# Patient Record
Sex: FEMALE | Employment: OTHER | URBAN - METROPOLITAN AREA
[De-identification: names, ages, dates, MRNs, and addresses within clinical notes are randomized per-mention and may not be internally consistent; named-entity substitution may affect disease eponyms.]

---

## 2017-05-22 ENCOUNTER — NEW REFERRAL (OUTPATIENT)
Dept: URBAN - METROPOLITAN AREA CLINIC 18 | Facility: CLINIC | Age: 82
End: 2017-05-22

## 2017-05-22 VITALS — HEIGHT: 55 IN | SYSTOLIC BLOOD PRESSURE: 118 MMHG | DIASTOLIC BLOOD PRESSURE: 78 MMHG

## 2017-05-24 ASSESSMENT — VISUAL ACUITY
OS_SC: 20/200
OD_SC: 20/50

## 2017-05-24 ASSESSMENT — TONOMETRY
OD_IOP_MMHG: 15
OS_IOP_MMHG: 15

## 2019-08-26 NOTE — PATIENT DISCUSSION
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 PATIENT UNDERSTANDS THAT CATARACT SURGERY WILL NOT FIX HER IRREGULAR SURFACE AND HER VISION MAY REMAIN BLURRY AFTER SURGERY.

## 2019-08-26 NOTE — PATIENT DISCUSSION
Surgery  Counseling: I have discussed the option of glasses versus cataract surgery versus following. It was explained that when vision no longer meets the patient's visual needs and a new prescription for glasses is not likely to improve the patient's visual symptoms, the option of cataract surgery is a reasonable next step. It was explained that there is no guarantee that removing the cataract will improve their visual symptoms, however; it is believed that the cataract is contributing to the patient's visual impairment and surgery may significantly improve both the visual and functional status of the patient. The risks, benefits and alternatives of surgery were discussed with the patient. After this discussion, the patient desires to proceed with cataract surgery with implantation of an intraocular lens to improve vision.

## 2019-08-26 NOTE — PATIENT DISCUSSION
REFRACTIVE ERROR, OU - DISCUSSED OPTION OF CORRECTING AT THE TIME OF CATARACT SURGERY. PT UNDERSTANDS AND ELECTS BEST QUALITY OF DISTANCE VISION AND WILL WEAR GLASSES FOR READING.

## 2019-11-01 NOTE — PATIENT DISCUSSION
CATARACTS, OS- SURGERY NOT RECOMMENDED AT THIS TIME. EXPLAINED THAT NEW GLS SEEM TO IMPROVE THE VA AND ADVISED PT TO RETURN TO REFERRING PHYSICIAN FOR UPDATED RX.  WILL REVISIT SX IF GLS DO NOT PROVIDE SATISFACTORY VA.

## 2019-11-01 NOTE — PATIENT DISCUSSION
New Prescription: prednisol ace-gatiflox-bromfen (prednisol ace-gatiflox-bromfen): drops,suspension: 1-0.5-0.075% 1 drop three times a day as directed into affected eye 11-

## 2019-11-01 NOTE — PATIENT DISCUSSION
REFRACTIVE ERROR OS - OFFERED REFRACTIVE SURGERY. THE PT DOES NOT WANT TO WEAR GLASSES. ADV PT THAT THIS IS NOT COVERED BY HER INSURANCE. THE PT CHOOSES TO PROCEED WITH RLE OS. SHE UNDERSTANDS GLASSES MAY BE NEEDED FOR NEAR AND INTERMEDIATE ACTIVITIES.

## 2019-11-13 NOTE — PATIENT DISCUSSION
SAME DAY S/P PC IOL, OS - CONTINUE DROPS AS DIRECTED. PT RELEASED TO  PROVIDER FOR CONTINUED CARE. RTC PRN.

## 2019-11-13 NOTE — PATIENT DISCUSSION
***This patient had  refractive cataract surgery performed. A toric IOL was placed to achieve a target refraction of  PLANO (which should provide them with satisfactory distance vision with the aid of glasses/contact lenses). ***

## 2019-11-13 NOTE — PATIENT DISCUSSION
Continue: prednisol ace-gatiflox-bromfen (prednisol ace-gatiflox-bromfen): drops,suspension: 1-0.5-0.075% 1 drop three times a day as directed into affected eye 11-

## 2022-11-08 ENCOUNTER — NEW PATIENT (OUTPATIENT)
Dept: URBAN - METROPOLITAN AREA CLINIC 9 | Facility: CLINIC | Age: 87
End: 2022-11-08

## 2022-11-08 DIAGNOSIS — H52.223: ICD-10-CM

## 2022-11-08 DIAGNOSIS — H04.123: ICD-10-CM

## 2022-11-08 DIAGNOSIS — H35.363: ICD-10-CM

## 2022-11-08 DIAGNOSIS — H35.373: ICD-10-CM

## 2022-11-08 PROCEDURE — 92134 CPTRZ OPH DX IMG PST SGM RTA: CPT

## 2022-11-08 PROCEDURE — 92004 COMPRE OPH EXAM NEW PT 1/>: CPT

## 2022-11-08 PROCEDURE — 92015 DETERMINE REFRACTIVE STATE: CPT

## 2022-11-08 ASSESSMENT — TONOMETRY
OS_IOP_MMHG: 12
OD_IOP_MMHG: 12

## 2022-11-08 ASSESSMENT — VISUAL ACUITY
OS_SC: J10
OU_SC: 20/30-1
OS_SC: 20/40-1
OD_SC: 20/30-1
OD_SC: J5
OU_SC: J2

## 2022-11-08 ASSESSMENT — KERATOMETRY
OD_K2POWER_DIOPTERS: 45.00
OD_AXISANGLE2_DEGREES: 63
OS_AXISANGLE_DEGREES: 59
OD_AXISANGLE_DEGREES: 153
OD_K1POWER_DIOPTERS: 44.25
OS_AXISANGLE2_DEGREES: 149
OS_K1POWER_DIOPTERS: 44.25
OS_K2POWER_DIOPTERS: 45.25

## 2024-02-21 ENCOUNTER — ESTABLISHED PATIENT (OUTPATIENT)
Dept: URBAN - METROPOLITAN AREA CLINIC 17 | Facility: CLINIC | Age: 89
End: 2024-02-21

## 2024-02-21 DIAGNOSIS — H04.123: ICD-10-CM

## 2024-02-21 DIAGNOSIS — H52.223: ICD-10-CM

## 2024-02-21 DIAGNOSIS — H35.373: ICD-10-CM

## 2024-02-21 DIAGNOSIS — H40.013: ICD-10-CM

## 2024-02-21 PROCEDURE — 92015 DETERMINE REFRACTIVE STATE: CPT

## 2024-02-21 PROCEDURE — 99214 OFFICE O/P EST MOD 30 MIN: CPT

## 2024-02-21 PROCEDURE — 92134 CPTRZ OPH DX IMG PST SGM RTA: CPT

## 2024-02-21 ASSESSMENT — KERATOMETRY
OD_AXISANGLE_DEGREES: 153
OS_AXISANGLE2_DEGREES: 149
OS_K1POWER_DIOPTERS: 44.25
OD_K1POWER_DIOPTERS: 44.25
OD_AXISANGLE2_DEGREES: 63
OD_K2POWER_DIOPTERS: 45.00
OS_K2POWER_DIOPTERS: 45.25
OS_AXISANGLE_DEGREES: 59

## 2024-02-21 ASSESSMENT — TONOMETRY
OS_IOP_MMHG: 10
OD_IOP_MMHG: 11

## 2024-02-21 ASSESSMENT — VISUAL ACUITY
OS_SC: 20/60
OD_SC: 20/40